# Patient Record
Sex: MALE | Race: WHITE | NOT HISPANIC OR LATINO | Employment: FULL TIME | URBAN - NONMETROPOLITAN AREA
[De-identification: names, ages, dates, MRNs, and addresses within clinical notes are randomized per-mention and may not be internally consistent; named-entity substitution may affect disease eponyms.]

---

## 2024-07-21 ENCOUNTER — HOSPITAL ENCOUNTER (EMERGENCY)
Facility: HOSPITAL | Age: 24
Discharge: HOME | End: 2024-07-21
Attending: STUDENT IN AN ORGANIZED HEALTH CARE EDUCATION/TRAINING PROGRAM
Payer: COMMERCIAL

## 2024-07-21 ENCOUNTER — APPOINTMENT (EMERGENCY)
Dept: CT IMAGING | Facility: HOSPITAL | Age: 24
End: 2024-07-21
Payer: COMMERCIAL

## 2024-07-21 VITALS
WEIGHT: 114 LBS | HEART RATE: 91 BPM | OXYGEN SATURATION: 98 % | SYSTOLIC BLOOD PRESSURE: 133 MMHG | BODY MASS INDEX: 18.99 KG/M2 | TEMPERATURE: 98.5 F | RESPIRATION RATE: 18 BRPM | DIASTOLIC BLOOD PRESSURE: 90 MMHG | HEIGHT: 65 IN

## 2024-07-21 DIAGNOSIS — M54.9 BACK PAIN: Primary | ICD-10-CM

## 2024-07-21 LAB
ALBUMIN SERPL BCG-MCNC: 5 G/DL (ref 3.5–5)
ALP SERPL-CCNC: 60 U/L (ref 34–104)
ALT SERPL W P-5'-P-CCNC: 38 U/L (ref 7–52)
ANION GAP SERPL CALCULATED.3IONS-SCNC: 8 MMOL/L (ref 4–13)
AST SERPL W P-5'-P-CCNC: 29 U/L (ref 13–39)
BASOPHILS # BLD AUTO: 0.04 THOUSANDS/ÂΜL (ref 0–0.1)
BASOPHILS NFR BLD AUTO: 1 % (ref 0–1)
BILIRUB SERPL-MCNC: 0.91 MG/DL (ref 0.2–1)
BILIRUB UR QL STRIP: NEGATIVE
BUN SERPL-MCNC: 14 MG/DL (ref 5–25)
CALCIUM SERPL-MCNC: 9.6 MG/DL (ref 8.4–10.2)
CHLORIDE SERPL-SCNC: 101 MMOL/L (ref 96–108)
CLARITY UR: NORMAL
CO2 SERPL-SCNC: 28 MMOL/L (ref 21–32)
COLOR UR: YELLOW
CREAT SERPL-MCNC: 0.81 MG/DL (ref 0.6–1.3)
EOSINOPHIL # BLD AUTO: 0.07 THOUSAND/ÂΜL (ref 0–0.61)
EOSINOPHIL NFR BLD AUTO: 1 % (ref 0–6)
ERYTHROCYTE [DISTWIDTH] IN BLOOD BY AUTOMATED COUNT: 12.6 % (ref 11.6–15.1)
GFR SERPL CREATININE-BSD FRML MDRD: 126 ML/MIN/1.73SQ M
GLUCOSE SERPL-MCNC: 85 MG/DL (ref 65–140)
GLUCOSE UR STRIP-MCNC: NEGATIVE MG/DL
HCT VFR BLD AUTO: 42.8 % (ref 36.5–49.3)
HGB BLD-MCNC: 15 G/DL (ref 12–17)
HGB UR QL STRIP.AUTO: NEGATIVE
IMM GRANULOCYTES # BLD AUTO: 0.03 THOUSAND/UL (ref 0–0.2)
IMM GRANULOCYTES NFR BLD AUTO: 0 % (ref 0–2)
KETONES UR STRIP-MCNC: NEGATIVE MG/DL
LACTATE SERPL-SCNC: 0.6 MMOL/L (ref 0.5–2)
LEUKOCYTE ESTERASE UR QL STRIP: NEGATIVE
LYMPHOCYTES # BLD AUTO: 2.6 THOUSANDS/ÂΜL (ref 0.6–4.47)
LYMPHOCYTES NFR BLD AUTO: 34 % (ref 14–44)
MCH RBC QN AUTO: 31.6 PG (ref 26.8–34.3)
MCHC RBC AUTO-ENTMCNC: 35 G/DL (ref 31.4–37.4)
MCV RBC AUTO: 90 FL (ref 82–98)
MONOCYTES # BLD AUTO: 0.72 THOUSAND/ÂΜL (ref 0.17–1.22)
MONOCYTES NFR BLD AUTO: 9 % (ref 4–12)
NEUTROPHILS # BLD AUTO: 4.23 THOUSANDS/ÂΜL (ref 1.85–7.62)
NEUTS SEG NFR BLD AUTO: 55 % (ref 43–75)
NITRITE UR QL STRIP: NEGATIVE
NRBC BLD AUTO-RTO: 0 /100 WBCS
PH UR STRIP.AUTO: 7.5 [PH]
PLATELET # BLD AUTO: 316 THOUSANDS/UL (ref 149–390)
PMV BLD AUTO: 9.3 FL (ref 8.9–12.7)
POTASSIUM SERPL-SCNC: 3.6 MMOL/L (ref 3.5–5.3)
PROT SERPL-MCNC: 7.6 G/DL (ref 6.4–8.4)
PROT UR STRIP-MCNC: NEGATIVE MG/DL
RBC # BLD AUTO: 4.75 MILLION/UL (ref 3.88–5.62)
SODIUM SERPL-SCNC: 137 MMOL/L (ref 135–147)
SP GR UR STRIP.AUTO: 1.01 (ref 1–1.03)
UROBILINOGEN UR QL STRIP.AUTO: 0.2 E.U./DL
WBC # BLD AUTO: 7.69 THOUSAND/UL (ref 4.31–10.16)

## 2024-07-21 PROCEDURE — 99284 EMERGENCY DEPT VISIT MOD MDM: CPT | Performed by: PHYSICIAN ASSISTANT

## 2024-07-21 PROCEDURE — 36415 COLL VENOUS BLD VENIPUNCTURE: CPT | Performed by: PHYSICIAN ASSISTANT

## 2024-07-21 PROCEDURE — 74176 CT ABD & PELVIS W/O CONTRAST: CPT

## 2024-07-21 PROCEDURE — 80053 COMPREHEN METABOLIC PANEL: CPT | Performed by: PHYSICIAN ASSISTANT

## 2024-07-21 PROCEDURE — 99284 EMERGENCY DEPT VISIT MOD MDM: CPT

## 2024-07-21 PROCEDURE — 81003 URINALYSIS AUTO W/O SCOPE: CPT | Performed by: PHYSICIAN ASSISTANT

## 2024-07-21 PROCEDURE — 96361 HYDRATE IV INFUSION ADD-ON: CPT

## 2024-07-21 PROCEDURE — 85025 COMPLETE CBC W/AUTO DIFF WBC: CPT | Performed by: PHYSICIAN ASSISTANT

## 2024-07-21 PROCEDURE — 96375 TX/PRO/DX INJ NEW DRUG ADDON: CPT

## 2024-07-21 PROCEDURE — 96374 THER/PROPH/DIAG INJ IV PUSH: CPT

## 2024-07-21 PROCEDURE — 83605 ASSAY OF LACTIC ACID: CPT | Performed by: PHYSICIAN ASSISTANT

## 2024-07-21 RX ORDER — NAPROXEN 500 MG/1
500 TABLET ORAL 2 TIMES DAILY WITH MEALS
Qty: 10 TABLET | Refills: 0 | Status: SHIPPED | OUTPATIENT
Start: 2024-07-21 | End: 2024-07-26

## 2024-07-21 RX ORDER — KETOROLAC TROMETHAMINE 30 MG/ML
15 INJECTION, SOLUTION INTRAMUSCULAR; INTRAVENOUS ONCE
Status: COMPLETED | OUTPATIENT
Start: 2024-07-21 | End: 2024-07-21

## 2024-07-21 RX ORDER — ONDANSETRON 4 MG/1
4 TABLET, FILM COATED ORAL EVERY 6 HOURS
Qty: 12 TABLET | Refills: 0 | Status: SHIPPED | OUTPATIENT
Start: 2024-07-21

## 2024-07-21 RX ORDER — ONDANSETRON 2 MG/ML
4 INJECTION INTRAMUSCULAR; INTRAVENOUS ONCE
Status: COMPLETED | OUTPATIENT
Start: 2024-07-21 | End: 2024-07-21

## 2024-07-21 RX ADMIN — SODIUM CHLORIDE 1000 ML: 0.9 INJECTION, SOLUTION INTRAVENOUS at 15:26

## 2024-07-21 RX ADMIN — ONDANSETRON 4 MG: 2 INJECTION INTRAMUSCULAR; INTRAVENOUS at 15:26

## 2024-07-21 RX ADMIN — KETOROLAC TROMETHAMINE 15 MG: 30 INJECTION, SOLUTION INTRAMUSCULAR; INTRAVENOUS at 15:26

## 2024-07-21 NOTE — DISCHARGE INSTRUCTIONS
Please follow-up with a family doctor, a referral has been placed for you.  Use Tylenol and Motrin as needed for the pain.  Please return to the emergency department with any new or worsening symptoms  CT renal stone study abdomen pelvis wo contrast   Final Result      Bilateral nonobstructing intrarenal calculi measuring up to 2 mm. No hydronephrosis.         Workstation performed: [id number]

## 2024-07-21 NOTE — ED PROVIDER NOTES
"History  Chief Complaint   Patient presents with    Flank Pain     Pt c/o intermittent bilateral flank pain radiating to abdomen w/vomiting over past couple months now more frequent over past week. Denies travel/sob/fevers/cough     23 year old male presents to the ED for evaluation of flank pain. States for the last 6 months intermittently around 8am he has a \"sharp\" back pain on both sides. States he tries to sit down to relieve the pain. States this past time, last Thursday the pain radiated into his abdomen. States sitting did not improve the symptoms and he felt disoriented. [place] like he as going to pass out. States he went to urgent care and had urine checked which was negative was told symptoms are due to back spasm. States last night he got sick after dinner with vomiting went to work this morning and vomited again after eating a banana. Denies dysuria hematuria of frequency. Denies fevers or chills.  Patient denies any testicular pain or swelling.  Patient denies any IV drug use.  He denies any loss of bowel or bladder control.  He denies any saddle paresthesias or rectal numbness.        None       History reviewed. No pertinent past medical history.    History reviewed. No pertinent surgical history.    History reviewed. No pertinent family history.  I have reviewed and agree with the history as documented.    E-Cigarette/Vaping    E-Cigarette Use Current Some Day User      E-Cigarette/Vaping Substances     Social History     Tobacco Use    Smoking status: Some Days    Smokeless tobacco: Never   Vaping Use    Vaping status: Some Days   Substance Use Topics    Alcohol use: Not Currently    Drug use: Never       Review of Systems   Constitutional: Negative.    HENT: Negative.     Respiratory: Negative.     Cardiovascular: Negative.    Gastrointestinal:  Positive for abdominal pain (back pain radiateing to abdomin), nausea and vomiting.   Genitourinary: Negative.    Musculoskeletal:  Positive for back pain. "   Skin: Negative.    Neurological: Negative.    All other systems reviewed and are negative.      Physical Exam  Physical Exam  Vitals and nursing note reviewed.   Constitutional:       General: He is not in acute distress.     Appearance: Normal appearance. He is not ill-appearing, toxic-appearing or diaphoretic.   HENT:      Head: Normocephalic.      Nose: Nose normal.      Mouth/Throat:      Mouth: Mucous membranes are moist.   Eyes:      Conjunctiva/sclera: Conjunctivae normal.      Pupils: Pupils are equal, round, and reactive to light.   Cardiovascular:      Rate and Rhythm: Normal rate and regular rhythm.   Pulmonary:      Effort: Pulmonary effort is normal.      Breath sounds: Normal breath sounds. No stridor. No wheezing, rhonchi or rales.   Chest:      Chest wall: No tenderness.   Abdominal:      General: Abdomen is flat. There is no distension.      Palpations: Abdomen is soft.      Tenderness: There is no abdominal tenderness. There is no right CVA tenderness or left CVA tenderness.   Musculoskeletal:         General: No tenderness. Normal range of motion.      Cervical back: Normal range of motion.   Skin:     General: Skin is warm and dry.      Capillary Refill: Capillary refill takes less than 2 seconds.      Findings: No bruising, erythema or rash.   Neurological:      General: No focal deficit present.      Mental Status: He is alert.   Psychiatric:         Mood and Affect: Mood normal.         Vital Signs  ED Triage Vitals [07/21/24 1433]   Temperature Pulse Respirations Blood Pressure SpO2   98.5 °F (36.9 °C) 91 18 133/90 98 %      Temp src Heart Rate Source Patient Position - Orthostatic VS BP Location FiO2 (%)   -- Monitor Sitting Left arm --      Pain Score       No Pain           Vitals:    07/21/24 1433 07/21/24 1730   BP: 133/90 133/90   Pulse: 91 91   Patient Position - Orthostatic VS: Sitting          Visual Acuity      ED Medications  Medications   sodium chloride 0.9 % bolus 1,000 mL (0  mL Intravenous Stopped 7/21/24 1600)   ondansetron (ZOFRAN) injection 4 mg (4 mg Intravenous Given 7/21/24 1526)   ketorolac (TORADOL) injection 15 mg (15 mg Intravenous Given 7/21/24 1526)       Diagnostic Studies  Results Reviewed       Procedure Component Value Units Date/Time    UA w Reflex to Microscopic w Reflex to Culture [[id number]] Collected: 07/21/24 1634    Lab Status: Final result Specimen: Urine, Clean Catch Updated: 07/21/24 1639     Color, UA Yellow     Clarity, UA Cloudy     Specific Gravity, UA 1.010     pH, UA 7.5     Leukocytes, UA Negative     Nitrite, UA Negative     Protein, UA Negative mg/dl      Glucose, UA Negative mg/dl      Ketones, UA Negative mg/dl      Urobilinogen, UA 0.2 E.U./dl      Bilirubin, UA Negative     Occult Blood, UA Negative    Comprehensive metabolic panel [[id number]] Collected: 07/21/24 1523    Lab Status: Final result Specimen: Blood from Arm, Left Updated: 07/21/24 1548     Sodium 137 mmol/L      Potassium 3.6 mmol/L      Chloride 101 mmol/L      CO2 28 mmol/L      ANION GAP 8 mmol/L      BUN 14 mg/dL      Creatinine 0.81 mg/dL      Glucose 85 mg/dL      Calcium 9.6 mg/dL      AST 29 U/L      ALT 38 U/L      Alkaline Phosphatase 60 U/L      Total Protein 7.6 g/dL      Albumin 5.0 g/dL      Total Bilirubin 0.91 mg/dL      eGFR 126 ml/min/1.73sq m     Narrative:      National Kidney Disease Foundation guidelines for Chronic Kidney Disease (CKD):     Stage 1 with normal or high GFR (GFR > 90 mL/min/1.73 square meters)    Stage 2 Mild CKD (GFR = 60-89 mL/min/1.73 square meters)    Stage 3A Moderate CKD (GFR = 45-59 mL/min/1.73 square meters)    Stage 3B Moderate CKD (GFR = 30-44 mL/min/1.73 square meters)    Stage 4 Severe CKD (GFR = 15-29 mL/min/1.73 square meters)    Stage 5 End Stage CKD (GFR <15 mL/min/1.73 square meters)  Note: GFR calculation is accurate only with a steady state creatinine    Lactic acid, plasma (w/reflex if result > 2.0) [[id number]]  (Normal)  Collected: 07/21/24 1523    Lab Status: Final result Specimen: Blood from Arm, Left Updated: 07/21/24 1548     LACTIC ACID 0.6 mmol/L     Narrative:      Result may be elevated if tourniquet was used during collection.    CBC and differential [[id number]] Collected: 07/21/24 1523    Lab Status: Final result Specimen: Blood from Arm, Left Updated: 07/21/24 1534     WBC 7.69 Thousand/uL      RBC 4.75 Million/uL      Hemoglobin 15.0 g/dL      Hematocrit 42.8 %      MCV 90 fL      MCH 31.6 pg      MCHC 35.0 g/dL      RDW 12.6 %      MPV 9.3 fL      Platelets 316 Thousands/uL      nRBC 0 /100 WBCs      Segmented % 55 %      Immature Grans % 0 %      Lymphocytes % 34 %      Monocytes % 9 %      Eosinophils Relative 1 %      Basophils Relative 1 %      Absolute Neutrophils 4.23 Thousands/µL      Absolute Immature Grans 0.03 Thousand/uL      Absolute Lymphocytes 2.60 Thousands/µL      Absolute Monocytes 0.72 Thousand/µL      Eosinophils Absolute 0.07 Thousand/µL      Basophils Absolute 0.04 Thousands/µL                    CT renal stone study abdomen pelvis wo contrast   Final Result by Charles Lopez MD (07/21 1721)      Bilateral nonobstructing intrarenal calculi measuring up to 2 mm. No hydronephrosis.         Workstation performed: [id number]                    Procedures  Procedures         ED Course  ED Course as of 07/21/24 1826   Sun Jul 21, 2024   1537 WBC: 7.69   1552 LACTIC ACID: 0.6   1552 Creatinine: 0.81   1552 Comprehensive metabolic panel  Unremarkable    1649 UA w Reflex to Microscopic w Reflex to Culture  Negative for UTI   1657 Patient updated on results and findings.  Resting comfortably.  CT pending   1728 CT renal stone: Bilateral nonobstructing intrarenal calculi measuring up to 2 mm. No hydronephrosis.   1728 Discussed results and findings with the patient we discussed appropriate follow-up and patient verbalized understanding.  He is clinically and hemodynamically stable for discharge                                                Medical Decision Making  23-year-old male presents the emergency department for evaluation of intermittent episodes of back pain had 1 episode of pain rating to the stomach.  Recent nausea and vomiting.  Vitals and medical record reviewed. Abdominal exam without peritoneal signs.  No evidence of acute abdomen at this time.  Well-appearing.  Given work-up, low suspicion for acute hepatobiliary disease (including acute cholecystitis or cholangitis), acute pancreatitis, PUD (including gastric perforation), acute infectious processes (pneumonia, hepatitis, pyelonephritis), acute appendicitis, vascular catastrophe, bowel obstruction, viscus perforation, testicular torsion, or diverticulitis.  Lower concern for any back fracture no step-off deformities, no direct trauma or injury.  Lower concern for cauda equina syndrome patient denied any red flag symptoms.  Presentation not consistent with other acute emergent causes of abdominal pain at this time.  Discussed return precautions and follow-up and both patient and mother verbalized understanding.  Patient was clinically and hemodynamically stable for discharge      Problems Addressed:  Back pain: acute illness or injury    Amount and/or Complexity of Data Reviewed  Labs: ordered. Decision-making details documented in ED Course.  Radiology: ordered.    Risk  Prescription drug management.                 Disposition  Final diagnoses:   Back pain     Time reflects when diagnosis was documented in both MDM as applicable and the Disposition within this note       Time User Action Codes Description Comment    7/21/2024  5:28 PM Erin Shirley Add [M54.9] Back pain           ED Disposition       ED Disposition   Discharge    Condition   Stable    Date/Time   Sun Jul 21, 2024  5:28 PM    Comment   Martin Luo discharge to home/self care.                   Follow-up Information       Follow up With Specialties Details Why Contact Info     [facility]   [address]  [address]  [place] [address]  [phone]              Discharge Medication List as of 7/21/2024  5:30 PM        START taking these medications    Details   naproxen (Naprosyn) 500 mg tablet Take 1 tablet (500 mg total) by mouth 2 (two) times a day with meals for 5 days, Starting Sun 7/21/2024, Until Fri 7/26/2024, Normal      ondansetron (ZOFRAN) 4 mg tablet Take 1 tablet (4 mg total) by mouth every 6 (six) hours, Starting Sun 7/21/2024, Normal             No discharge procedures on file.    PDMP Review       None            ED Provider  Electronically Signed by             Erin Shirley PA-C  07/21/24 1563

## 2024-08-08 ENCOUNTER — OFFICE VISIT (OUTPATIENT)
Dept: URGENT CARE | Facility: CLINIC | Age: 24
End: 2024-08-08
Payer: COMMERCIAL

## 2024-08-08 ENCOUNTER — APPOINTMENT (EMERGENCY)
Dept: CT IMAGING | Facility: HOSPITAL | Age: 24
End: 2024-08-08
Payer: COMMERCIAL

## 2024-08-08 ENCOUNTER — HOSPITAL ENCOUNTER (EMERGENCY)
Facility: HOSPITAL | Age: 24
Discharge: HOME | End: 2024-08-08
Attending: EMERGENCY MEDICINE
Payer: COMMERCIAL

## 2024-08-08 VITALS
BODY MASS INDEX: 20.13 KG/M2 | SYSTOLIC BLOOD PRESSURE: 132 MMHG | TEMPERATURE: 97.6 F | WEIGHT: 120.81 LBS | HEART RATE: 72 BPM | HEIGHT: 65 IN | OXYGEN SATURATION: 99 % | DIASTOLIC BLOOD PRESSURE: 73 MMHG | RESPIRATION RATE: 16 BRPM

## 2024-08-08 VITALS
RESPIRATION RATE: 16 BRPM | HEART RATE: 77 BPM | OXYGEN SATURATION: 98 % | SYSTOLIC BLOOD PRESSURE: 123 MMHG | WEIGHT: 119.4 LBS | DIASTOLIC BLOOD PRESSURE: 75 MMHG | BODY MASS INDEX: 19.89 KG/M2 | HEIGHT: 65 IN | TEMPERATURE: 98.3 F

## 2024-08-08 DIAGNOSIS — N20.0 KIDNEY STONE: Primary | ICD-10-CM

## 2024-08-08 DIAGNOSIS — N39.0 COMPLICATED UTI (URINARY TRACT INFECTION): Primary | ICD-10-CM

## 2024-08-08 LAB
ALBUMIN SERPL BCG-MCNC: 4.9 G/DL (ref 3.5–5)
ALP SERPL-CCNC: 58 U/L (ref 34–104)
ALT SERPL W P-5'-P-CCNC: 29 U/L (ref 7–52)
ANION GAP SERPL CALCULATED.3IONS-SCNC: 10 MMOL/L (ref 4–13)
AST SERPL W P-5'-P-CCNC: 30 U/L (ref 13–39)
BACTERIA UR QL AUTO: NORMAL /HPF
BASOPHILS # BLD AUTO: 0.04 THOUSANDS/ÂΜL (ref 0–0.1)
BASOPHILS NFR BLD AUTO: 1 % (ref 0–1)
BILIRUB SERPL-MCNC: 1.25 MG/DL (ref 0.2–1)
BILIRUB UR QL STRIP: NEGATIVE
BUN SERPL-MCNC: 12 MG/DL (ref 5–25)
CALCIUM SERPL-MCNC: 9.8 MG/DL (ref 8.4–10.2)
CHLORIDE SERPL-SCNC: 103 MMOL/L (ref 96–108)
CLARITY UR: CLEAR
CO2 SERPL-SCNC: 25 MMOL/L (ref 21–32)
COLOR UR: YELLOW
CREAT SERPL-MCNC: 0.84 MG/DL (ref 0.6–1.3)
EOSINOPHIL # BLD AUTO: 0.02 THOUSAND/ÂΜL (ref 0–0.61)
EOSINOPHIL NFR BLD AUTO: 0 % (ref 0–6)
ERYTHROCYTE [DISTWIDTH] IN BLOOD BY AUTOMATED COUNT: 12.3 % (ref 11.6–15.1)
GFR SERPL CREATININE-BSD FRML MDRD: 124 ML/MIN/1.73SQ M
GLUCOSE SERPL-MCNC: 81 MG/DL (ref 65–140)
GLUCOSE UR STRIP-MCNC: NEGATIVE MG/DL
HCT VFR BLD AUTO: 42.7 % (ref 36.5–49.3)
HGB BLD-MCNC: 14.8 G/DL (ref 12–17)
HGB UR QL STRIP.AUTO: ABNORMAL
IMM GRANULOCYTES # BLD AUTO: 0.02 THOUSAND/UL (ref 0–0.2)
IMM GRANULOCYTES NFR BLD AUTO: 0 % (ref 0–2)
KETONES UR STRIP-MCNC: ABNORMAL MG/DL
LEUKOCYTE ESTERASE UR QL STRIP: NEGATIVE
LIPASE SERPL-CCNC: 13 U/L (ref 11–82)
LYMPHOCYTES # BLD AUTO: 2.01 THOUSANDS/ÂΜL (ref 0.6–4.47)
LYMPHOCYTES NFR BLD AUTO: 24 % (ref 14–44)
MCH RBC QN AUTO: 31.1 PG (ref 26.8–34.3)
MCHC RBC AUTO-ENTMCNC: 34.7 G/DL (ref 31.4–37.4)
MCV RBC AUTO: 90 FL (ref 82–98)
MONOCYTES # BLD AUTO: 0.69 THOUSAND/ÂΜL (ref 0.17–1.22)
MONOCYTES NFR BLD AUTO: 8 % (ref 4–12)
NEUTROPHILS # BLD AUTO: 5.52 THOUSANDS/ÂΜL (ref 1.85–7.62)
NEUTS SEG NFR BLD AUTO: 67 % (ref 43–75)
NITRITE UR QL STRIP: NEGATIVE
NON-SQ EPI CELLS URNS QL MICRO: NORMAL /HPF
NRBC BLD AUTO-RTO: 0 /100 WBCS
PH UR STRIP.AUTO: 6 [PH]
PLATELET # BLD AUTO: 254 THOUSANDS/UL (ref 149–390)
PMV BLD AUTO: 9.3 FL (ref 8.9–12.7)
POTASSIUM SERPL-SCNC: 3.8 MMOL/L (ref 3.5–5.3)
PROT SERPL-MCNC: 7.2 G/DL (ref 6.4–8.4)
PROT UR STRIP-MCNC: NEGATIVE MG/DL
RBC # BLD AUTO: 4.76 MILLION/UL (ref 3.88–5.62)
RBC #/AREA URNS AUTO: NORMAL /HPF
SL AMB  POCT GLUCOSE, UA: NEGATIVE
SL AMB LEUKOCYTE ESTERASE,UA: ABNORMAL
SL AMB POCT BILIRUBIN,UA: NEGATIVE
SL AMB POCT BLOOD,UA: ABNORMAL
SL AMB POCT CLARITY,UA: CLEAR
SL AMB POCT COLOR,UA: YELLOW
SL AMB POCT KETONES,UA: ABNORMAL
SL AMB POCT NITRITE,UA: NEGATIVE
SL AMB POCT PH,UA: 6
SL AMB POCT SPECIFIC GRAVITY,UA: 1.01
SL AMB POCT URINE PROTEIN: NEGATIVE
SL AMB POCT UROBILINOGEN: ABNORMAL
SODIUM SERPL-SCNC: 138 MMOL/L (ref 135–147)
SP GR UR STRIP.AUTO: 1.01 (ref 1–1.03)
UROBILINOGEN UR QL STRIP.AUTO: 0.2 E.U./DL
WBC # BLD AUTO: 8.3 THOUSAND/UL (ref 4.31–10.16)
WBC #/AREA URNS AUTO: NORMAL /HPF

## 2024-08-08 PROCEDURE — S9083 URGENT CARE CENTER GLOBAL: HCPCS

## 2024-08-08 PROCEDURE — 99284 EMERGENCY DEPT VISIT MOD MDM: CPT | Performed by: EMERGENCY MEDICINE

## 2024-08-08 PROCEDURE — 74176 CT ABD & PELVIS W/O CONTRAST: CPT

## 2024-08-08 PROCEDURE — 80053 COMPREHEN METABOLIC PANEL: CPT | Performed by: EMERGENCY MEDICINE

## 2024-08-08 PROCEDURE — 85025 COMPLETE CBC W/AUTO DIFF WBC: CPT | Performed by: EMERGENCY MEDICINE

## 2024-08-08 PROCEDURE — 83690 ASSAY OF LIPASE: CPT | Performed by: EMERGENCY MEDICINE

## 2024-08-08 PROCEDURE — 36415 COLL VENOUS BLD VENIPUNCTURE: CPT | Performed by: EMERGENCY MEDICINE

## 2024-08-08 PROCEDURE — 96374 THER/PROPH/DIAG INJ IV PUSH: CPT

## 2024-08-08 PROCEDURE — 81001 URINALYSIS AUTO W/SCOPE: CPT | Performed by: EMERGENCY MEDICINE

## 2024-08-08 PROCEDURE — 99283 EMERGENCY DEPT VISIT LOW MDM: CPT

## 2024-08-08 PROCEDURE — 81002 URINALYSIS NONAUTO W/O SCOPE: CPT

## 2024-08-08 PROCEDURE — G0382 LEV 3 HOSP TYPE B ED VISIT: HCPCS

## 2024-08-08 PROCEDURE — 96375 TX/PRO/DX INJ NEW DRUG ADDON: CPT

## 2024-08-08 PROCEDURE — 96361 HYDRATE IV INFUSION ADD-ON: CPT

## 2024-08-08 RX ORDER — ONDANSETRON 2 MG/ML
4 INJECTION INTRAMUSCULAR; INTRAVENOUS ONCE
Status: COMPLETED | OUTPATIENT
Start: 2024-08-08 | End: 2024-08-08

## 2024-08-08 RX ORDER — KETOROLAC TROMETHAMINE 30 MG/ML
30 INJECTION, SOLUTION INTRAMUSCULAR; INTRAVENOUS ONCE
Status: COMPLETED | OUTPATIENT
Start: 2024-08-08 | End: 2024-08-08

## 2024-08-08 RX ORDER — CEPHALEXIN 500 MG/1
500 CAPSULE ORAL EVERY 6 HOURS SCHEDULED
Qty: 40 CAPSULE | Refills: 0 | Status: SHIPPED | OUTPATIENT
Start: 2024-08-08 | End: 2024-08-18

## 2024-08-08 RX ORDER — ONDANSETRON 4 MG/1
4 TABLET, FILM COATED ORAL EVERY 6 HOURS
Qty: 20 TABLET | Refills: 0 | Status: SHIPPED | OUTPATIENT
Start: 2024-08-08

## 2024-08-08 RX ORDER — TAMSULOSIN HYDROCHLORIDE 0.4 MG/1
0.4 CAPSULE ORAL
Qty: 10 CAPSULE | Refills: 0 | Status: SHIPPED | OUTPATIENT
Start: 2024-08-08 | End: 2024-08-18

## 2024-08-08 RX ORDER — TAMSULOSIN HYDROCHLORIDE 0.4 MG/1
0.4 CAPSULE ORAL
Qty: 7 CAPSULE | Refills: 0 | Status: SHIPPED | OUTPATIENT
Start: 2024-08-08 | End: 2024-08-15

## 2024-08-08 RX ORDER — TAMSULOSIN HYDROCHLORIDE 0.4 MG/1
0.4 CAPSULE ORAL ONCE
Status: COMPLETED | OUTPATIENT
Start: 2024-08-08 | End: 2024-08-08

## 2024-08-08 RX ORDER — IBUPROFEN 800 MG/1
800 TABLET ORAL 3 TIMES DAILY
Qty: 21 TABLET | Refills: 0 | Status: SHIPPED | OUTPATIENT
Start: 2024-08-08

## 2024-08-08 RX ADMIN — ONDANSETRON 4 MG: 2 INJECTION INTRAMUSCULAR; INTRAVENOUS at 15:26

## 2024-08-08 RX ADMIN — TAMSULOSIN HYDROCHLORIDE 0.4 MG: 0.4 CAPSULE ORAL at 16:37

## 2024-08-08 RX ADMIN — KETOROLAC TROMETHAMINE 30 MG: 30 INJECTION, SOLUTION INTRAMUSCULAR; INTRAVENOUS at 15:26

## 2024-08-08 RX ADMIN — SODIUM CHLORIDE 1000 ML: 0.9 INJECTION, SOLUTION INTRAVENOUS at 15:27

## 2024-08-08 NOTE — DISCHARGE INSTRUCTIONS
Return to the ER immediately for any worsening symptoms. Follow up with your doctor for further evaluation and management.

## 2024-08-08 NOTE — PATIENT INSTRUCTIONS
Recommended patient proceed to ED for further evaluation. Discussed risks of delayed evaluation and intervention with serious etiology. Patient verbalized understanding.     Take cephalexin as prescribed  Take flomax as prescribed    Drink plenty of water   Cranberry supplements  Urinate within 5 minutes following intercourse  Avoid holding in urine    Follow up with PCP in 3-5 days.  Proceed to  ER if symptoms worsen.    If tests are performed, our office will contact you with results only if changes need to made to the care plan discussed with you at the visit. You can review your full results on [facility] Mychart.

## 2024-08-08 NOTE — ED PROVIDER NOTES
History  Chief Complaint   Patient presents with    Difficulty Urinating     Patient reports being sent from  for blood in his urine. Recently diagnosed with kidney stones two weeks ago and now has difficulty urinating since last evening  .      23-year-old male presents to the ED for evaluation of hematuria today.  Patient states that he was diagnosed with a kidney stone 2 weeks ago.  He denies any fever and chills states that he has been having left-sided abdominal pain radiating to his flank.  He states his pain is intermittent.        Prior to Admission Medications   Prescriptions Last Dose Informant Patient Reported? Taking?   cephalexin (KEFLEX) 500 mg capsule   No No   Sig: Take 1 capsule (500 mg total) by mouth every 6 (six) hours for 10 days   naproxen (Naprosyn) 500 mg tablet   No No   Sig: Take 1 tablet (500 mg total) by mouth 2 (two) times a day with meals for 5 days   ondansetron (ZOFRAN) 4 mg tablet   No No   Sig: Take 1 tablet (4 mg total) by mouth every 6 (six) hours   tamsulosin (FLOMAX) 0.4 mg   No No   Sig: Take 1 capsule (0.4 mg total) by mouth daily with dinner for 7 days      Facility-Administered Medications: None       Past Medical History:   Diagnosis Date    Essential tremor     Kidney stone     Migraines        Past Surgical History:   Procedure Laterality Date    NO PAST SURGERIES         Family History   Problem Relation Age of Onset    No Known Problems Mother     No Known Problems Father      I have reviewed and agree with the history as documented.    E-Cigarette/Vaping    E-Cigarette Use Current Some Day User      E-Cigarette/Vaping Substances    Nicotine Yes     THC No     CBD No     Flavoring Yes      Social History     Tobacco Use    Smoking status: Former     Types: Cigarettes    Smokeless tobacco: Never   Vaping Use    Vaping status: Some Days    Substances: Nicotine, Flavoring   Substance Use Topics    Alcohol use: Not Currently    Drug use: Never       Review of Systems    Constitutional:  Negative for chills and fever.   HENT:  Negative for ear pain and sore throat.    Eyes:  Negative for pain and visual disturbance.   Respiratory:  Negative for cough and shortness of breath.    Cardiovascular:  Negative for chest pain and palpitations.   Gastrointestinal:  Negative for abdominal pain and vomiting.   Genitourinary:  Positive for difficulty urinating, flank pain and hematuria. Negative for dysuria.   Musculoskeletal:  Negative for arthralgias and back pain.   Skin:  Negative for color change and rash.   Neurological:  Negative for seizures and syncope.   All other systems reviewed and are negative.      Physical Exam  Physical Exam  Vitals and nursing note reviewed.   Constitutional:       General: He is not in acute distress.     Appearance: Normal appearance. He is well-developed and normal weight. He is not ill-appearing.   HENT:      Head: Normocephalic and atraumatic.      Right Ear: External ear normal.      Left Ear: External ear normal.      Nose: Nose normal.   Eyes:      Extraocular Movements: Extraocular movements intact.      Conjunctiva/sclera: Conjunctivae normal.   Cardiovascular:      Rate and Rhythm: Normal rate and regular rhythm.      Heart sounds: No murmur heard.  Pulmonary:      Effort: Pulmonary effort is normal. No respiratory distress.      Breath sounds: Normal breath sounds.   Abdominal:      General: Abdomen is flat.      Palpations: Abdomen is soft.      Tenderness: There is no abdominal tenderness.   Musculoskeletal:         General: No swelling, tenderness, deformity or signs of injury. Normal range of motion.      Cervical back: Normal range of motion and neck supple.      Right lower leg: No edema.      Left lower leg: No edema.   Skin:     General: Skin is warm and dry.      Capillary Refill: Capillary refill takes less than 2 seconds.   Neurological:      General: No focal deficit present.      Mental Status: He is alert and oriented to person,  place, and time. Mental status is at baseline.   Psychiatric:         Mood and Affect: Mood normal.         Vital Signs  ED Triage Vitals   Temperature Pulse Respirations Blood Pressure SpO2   08/08/24 1512 08/08/24 1512 08/08/24 1512 08/08/24 1512 08/08/24 1512   97.6 °F (36.4 °C) 84 16 144/81 100 %      Temp Source Heart Rate Source Patient Position - Orthostatic VS BP Location FiO2 (%)   08/08/24 1512 08/08/24 1512 08/08/24 1512 08/08/24 1512 --   Temporal Monitor Lying Right arm       Pain Score       08/08/24 1526       3           Vitals:    08/08/24 1512 08/08/24 1545 08/08/24 1615 08/08/24 1645   BP: 144/81 135/75 133/72 132/73   Pulse: 84 73 72 72   Patient Position - Orthostatic VS: Lying Lying Lying Lying         Visual Acuity      ED Medications  Medications   sodium chloride 0.9 % bolus 1,000 mL (0 mL Intravenous Stopped 8/8/24 1627)   ketorolac (TORADOL) injection 30 mg (30 mg Intravenous Given 8/8/24 1526)   ondansetron (ZOFRAN) injection 4 mg (4 mg Intravenous Given 8/8/24 1526)   tamsulosin (FLOMAX) capsule 0.4 mg (0.4 mg Oral Given 8/8/24 1637)       Diagnostic Studies  Results Reviewed       Procedure Component Value Units Date/Time    Urine Microscopic [[id number]]  (Normal) Collected: 08/08/24 1601    Lab Status: Final result Specimen: Urine, Clean Catch Updated: 08/08/24 1618     RBC, UA 0-1 /hpf      WBC, UA None Seen /hpf      Epithelial Cells Occasional /hpf      Bacteria, UA None Seen /hpf     UA w Reflex to Microscopic w Reflex to Culture [[id number]]  (Abnormal) Collected: 08/08/24 1601    Lab Status: Final result Specimen: Urine, Clean Catch Updated: 08/08/24 1613     Color, UA Yellow     Clarity, UA Clear     Specific Gravity, UA 1.010     pH, UA 6.0     Leukocytes, UA Negative     Nitrite, UA Negative     Protein, UA Negative mg/dl      Glucose, UA Negative mg/dl      Ketones, UA Trace mg/dl      Urobilinogen, UA 0.2 E.U./dl      Bilirubin, UA Negative     Occult Blood, UA Trace-Intact     CMP [[id number]]  (Abnormal) Collected: 08/08/24 1526    Lab Status: Final result Specimen: Blood from Arm, Left Updated: 08/08/24 1552     Sodium 138 mmol/L      Potassium 3.8 mmol/L      Chloride 103 mmol/L      CO2 25 mmol/L      ANION GAP 10 mmol/L      BUN 12 mg/dL      Creatinine 0.84 mg/dL      Glucose 81 mg/dL      Calcium 9.8 mg/dL      AST 30 U/L      ALT 29 U/L      Alkaline Phosphatase 58 U/L      Total Protein 7.2 g/dL      Albumin 4.9 g/dL      Total Bilirubin 1.25 mg/dL      eGFR 124 ml/min/1.73sq m     Narrative:      National Kidney Disease Foundation guidelines for Chronic Kidney Disease (CKD):     Stage 1 with normal or high GFR (GFR > 90 mL/min/1.73 square meters)    Stage 2 Mild CKD (GFR = 60-89 mL/min/1.73 square meters)    Stage 3A Moderate CKD (GFR = 45-59 mL/min/1.73 square meters)    Stage 3B Moderate CKD (GFR = 30-44 mL/min/1.73 square meters)    Stage 4 Severe CKD (GFR = 15-29 mL/min/1.73 square meters)    Stage 5 End Stage CKD (GFR <15 mL/min/1.73 square meters)  Note: GFR calculation is accurate only with a steady state creatinine    Lipase [[id number]]  (Normal) Collected: 08/08/24 1526    Lab Status: Final result Specimen: Blood from Arm, Left Updated: 08/08/24 1552     Lipase 13 u/L     CBC and differential [[id number]] Collected: 08/08/24 1526    Lab Status: Final result Specimen: Blood from Arm, Left Updated: 08/08/24 1535     WBC 8.30 Thousand/uL      RBC 4.76 Million/uL      Hemoglobin 14.8 g/dL      Hematocrit 42.7 %      MCV 90 fL      MCH 31.1 pg      MCHC 34.7 g/dL      RDW 12.3 %      MPV 9.3 fL      Platelets 254 Thousands/uL      nRBC 0 /100 WBCs      Segmented % 67 %      Immature Grans % 0 %      Lymphocytes % 24 %      Monocytes % 8 %      Eosinophils Relative 0 %      Basophils Relative 1 %      Absolute Neutrophils 5.52 Thousands/µL      Absolute Immature Grans 0.02 Thousand/uL      Absolute Lymphocytes 2.01 Thousands/µL      Absolute Monocytes 0.69 Thousand/µL       Eosinophils Absolute 0.02 Thousand/µL      Basophils Absolute 0.04 Thousands/µL                    CT renal stone study abdomen pelvis wo contrast   Final Result by Charles Lopez MD (08/08 1615)      Mild left hydroureteronephrosis due to an obstructing 2 mm calculus at the left UV junction image 2/138.         Workstation performed: [id number]                    Procedures  Procedures         ED Course       Patient stable during the ED course.  Will be discharged for outpatient follow-up with urology.                          SBIRT 22yo+      Flowsheet Row Most Recent Value   Initial Alcohol Screen: US AUDIT-C     1. How often do you have a drink containing alcohol? 0 Filed at: 08/08/2024 1511   2. How many drinks containing alcohol do you have on a typical day you are drinking?  0 Filed at: 08/08/2024 1511   3a. Male UNDER 65: How often do you have five or more drinks on one occasion? 0 Filed at: 08/08/2024 1511   Audit-C Score 0 Filed at: 08/08/2024 1511   THANG: How many times in the past year have you...    Used an illegal drug or used a prescription medication for non-medical reasons? Never Filed at: 08/08/2024 1511                      Medical Decision Making  Amount and/or Complexity of Data Reviewed  Labs: ordered.  Radiology: ordered.    Risk  Prescription drug management.                 Disposition  Final diagnoses:   Kidney stone     Time reflects when diagnosis was documented in both MDM as applicable and the Disposition within this note       Time User Action Codes Description Comment    8/8/2024  4:43 PM Belen Morley Add [N20.0] Kidney stone           ED Disposition       ED Disposition   Discharge    Condition   Stable    Date/Time   Thu Aug 8, 2024 1643    Comment   Martin Luo discharge to home/self care.                   Follow-up Information       Follow up With Specialties Details Why Contact Info    Frank D'Amico, MD Urology Schedule an appointment as soon as possible for a visit   [id number] [address]  [phone]              Discharge Medication List as of 8/8/2024  4:46 PM        START taking these medications    Details   ibuprofen (MOTRIN) 800 mg tablet Take 1 tablet (800 mg total) by mouth 3 (three) times a day, Starting u 8/8/2024, Normal      !! ondansetron (ZOFRAN) 4 mg tablet Take 1 tablet (4 mg total) by mouth every 6 (six) hours, Starting u 8/8/2024, Normal      !! tamsulosin (FLOMAX) 0.4 mg Take 1 capsule (0.4 mg total) by mouth daily with dinner for 10 days, Starting Thu 8/8/2024, Until Sun 8/18/2024, Normal       !! - Potential duplicate medications found. Please discuss with provider.        CONTINUE these medications which have NOT CHANGED    Details   cephalexin (KEFLEX) 500 mg capsule Take 1 capsule (500 mg total) by mouth every 6 (six) hours for 10 days, Starting Thu 8/8/2024, Until Sun 8/18/2024, Normal      naproxen (Naprosyn) 500 mg tablet Take 1 tablet (500 mg total) by mouth 2 (two) times a day with meals for 5 days, Starting Sun 7/21/2024, Until u 8/8/2024, Normal      !! ondansetron (ZOFRAN) 4 mg tablet Take 1 tablet (4 mg total) by mouth every 6 (six) hours, Starting Sun 7/21/2024, Normal      !! tamsulosin (FLOMAX) 0.4 mg Take 1 capsule (0.4 mg total) by mouth daily with dinner for 7 days, Starting Thu 8/8/2024, Until Thu 8/15/2024, Normal       !! - Potential duplicate medications found. Please discuss with provider.              PDMP Review       None            ED Provider  Electronically Signed by             Belen Morley DO  08/08/24 7666

## 2024-08-08 NOTE — PROGRESS NOTES
[facility]        NAME: Martin Luo is a 23 y.o. male  : 2000    MRN: [id number]  DATE: 2024  TIME: 2:44 PM    Assessment and Plan   Complicated UTI (urinary tract infection) [N39.0]  1. Complicated UTI (urinary tract infection)  POCT urine dip    cephalexin (KEFLEX) 500 mg capsule    tamsulosin (FLOMAX) 0.4 mg    Transfer to other facility        POCT urine dip: positive for blood, small leukocytes, and moderate ketones.   Patient only able to void 10ml of urine and reports he has been drinking lots of water today and has not urinated at home only very small dribbles.   Unable to check PVR or bladder scan at this location - concerned for obstructing stone vs FRANCO  Recommend ED eval    Patient Instructions     Recommended patient proceed to ED for further evaluation. Discussed risks of delayed evaluation and intervention with serious etiology. Patient verbalized understanding.     Take cephalexin as prescribed  Take flomax as prescribed    Drink plenty of water   Cranberry supplements  Urinate within 5 minutes following intercourse  Avoid holding in urine    Follow up with PCP in 3-5 days.  Proceed to  ER if symptoms worsen.    If tests are performed, our office will contact you with results only if changes need to made to the care plan discussed with you at the visit. [facility]    Chief Complaint     Chief Complaint   Patient presents with    Back Pain     In ER 2-3 weeks ago for back pain that would lead to syncopal episodes, dx with kidney stones. Back pain worsened yesterday and started with abdominal pain. Pt states he is having the urge to pee but only able to pass small amounts of urine starting yesterday.     Taking naproxen for pain.          History of Present Illness       23-year-old male arrives reporting that he was seen in the ER approximately 2 to 3 weeks ago and was diagnosed with kidney stones.  Patient reports that today  while he was at work his back pain worsened and patient was concerned he may be of actually passing a kidney stone at this time.  Patient reports pain was sharp and generalized in abdomen.  Patient reports that he has been drinking a lot of water but feels that he is not able to urinate and is only going very small amounts at a time.  Patient reports he is sexually active, has no concerns for STDs at this time, denies any penile discharge or pain.  Patient does report pain in testicles that started yesterday.  Patient reports pain in the testicles is generalized and will come when he feels like he has to urinate but is unable to go.    Back Pain  Associated symptoms include abdominal pain and dysuria. Pertinent negatives include no fever.       Review of Systems   Review of Systems   Constitutional:  Negative for chills, diaphoresis, fatigue and fever.   Gastrointestinal:  Positive for abdominal pain. Negative for abdominal distention, constipation, diarrhea, nausea and vomiting.   Genitourinary:  Positive for decreased urine volume, difficulty urinating (pt reports only going small amounts), dysuria and testicular pain. Negative for flank pain, penile discharge, penile pain, penile swelling and scrotal swelling.   Musculoskeletal:  Positive for back pain.         Current Medications       Current Outpatient Medications:     cephalexin (KEFLEX) 500 mg capsule, Take 1 capsule (500 mg total) by mouth every 6 (six) hours for 10 days, Disp: 40 capsule, Rfl: 0    naproxen (Naprosyn) 500 mg tablet, Take 1 tablet (500 mg total) by mouth 2 (two) times a day with meals for 5 days, Disp: 10 tablet, Rfl: 0    ondansetron (ZOFRAN) 4 mg tablet, Take 1 tablet (4 mg total) by mouth every 6 (six) hours, Disp: 12 tablet, Rfl: 0    tamsulosin (FLOMAX) 0.4 mg, Take 1 capsule (0.4 mg total) by mouth daily with dinner for 7 days, Disp: 7 capsule, Rfl: 0    Current Allergies     Allergies as of 08/08/2024 - Reviewed 08/08/2024   Allergen  "Reaction Noted    Dust mite extract Other (See Comments) 12/27/2016    Other Other (See Comments) 12/27/2016            The following portions of the patient's history were reviewed and updated as appropriate: allergies, current medications, past family history, past medical history, past social history, past surgical history and problem list.     Past Medical History:   Diagnosis Date    Essential tremor     Kidney stone     Migraines        Past Surgical History:   Procedure Laterality Date    NO PAST SURGERIES         Family History   Problem Relation Age of Onset    No Known Problems Mother     No Known Problems Father          Medications have been verified.        Objective   /75   Pulse 77   Temp 98.3 °F (36.8 °C)   Resp 16   Ht 5' 5\" (1.651 m)   Wt 54.2 kg (119 lb 6.4 oz)   SpO2 98%   BMI 19.87 kg/m²        Physical Exam     Physical Exam  Vitals and nursing note reviewed.   Constitutional:       General: He is not in acute distress.     Appearance: Normal appearance. He is not ill-appearing.   HENT:      Head: Normocephalic.      Right Ear: Tympanic membrane, ear canal and external ear normal.      Left Ear: Tympanic membrane, ear canal and external ear normal.      Nose: Nose normal. No congestion.      Mouth/Throat:      Mouth: Mucous membranes are moist.      Pharynx: No oropharyngeal exudate or posterior oropharyngeal erythema.   Eyes:      Extraocular Movements: Extraocular movements intact.      Conjunctiva/sclera: Conjunctivae normal.      Pupils: Pupils are equal, round, and reactive to light.   Cardiovascular:      Rate and Rhythm: Normal rate and regular rhythm.      Pulses: Normal pulses.      Heart sounds: Normal heart sounds.   Pulmonary:      Effort: Pulmonary effort is normal. No respiratory distress.      Breath sounds: Normal breath sounds. No stridor. No wheezing, rhonchi or rales.   Chest:      Chest wall: No tenderness.   Abdominal:      General: Abdomen is flat. Bowel " sounds are normal. There is no distension.      Palpations: Abdomen is soft.      Tenderness: There is generalized abdominal tenderness (generalized). There is no right CVA tenderness, left CVA tenderness or guarding.   Musculoskeletal:         General: Normal range of motion.      Cervical back: Normal range of motion and neck supple.   Lymphadenopathy:      Cervical: No cervical adenopathy.   Skin:     General: Skin is warm and dry.      Capillary Refill: Capillary refill takes less than 2 seconds.   Neurological:      General: No focal deficit present.      Mental Status: He is alert and oriented to person, place, and time.   Psychiatric:         Mood and Affect: Mood normal.         Behavior: Behavior normal.

## 2024-09-07 PROBLEM — Z87.898 HISTORY OF LEFT FLANK PAIN: Status: ACTIVE | Noted: 2024-09-07

## 2024-09-07 PROBLEM — N20.0 RENAL CALCULI: Status: ACTIVE | Noted: 2024-09-07

## 2024-09-07 PROBLEM — Z87.448 HISTORY OF HYDRONEPHROSIS: Status: ACTIVE | Noted: 2024-09-07

## 2024-09-07 NOTE — PROGRESS NOTES
UROLOGY PROGRESS NOTE         NAME: Martin Luo  AGE: 24 y.o. SEX: male  : 2000   MRN: [id number]    DATE: 2024  TIME: 12:16 PM    Assessment and Plan   Procedures     Impression:   1. History of hydronephrosis  2. History of hematuria  3. History of left flank pain  4. Renal calculi       Plan: Patient I discussed at a young age with recurrent nephrolithiasis currently asymptomatic.  I highly recommend low oxalate diet and he is agreed for a nephrology consult for medical evaluation of nephrolithiasis.  Possibly they will do a 24-hour urine testing on the patient.    Recommend OV with us with a KUB in 2 years      Chief Complaint   No chief complaint on file.    History of Present Illness     HPI: Martin Luo is a 24 y.o. year old male who presents with follow-up emergency room visit on 2024 for difficulty urinating and blood in urine.  Patient states 2 weeks prior to the ER was diagnosed with a kidney stone.    CT scan on 2024 showed bilateral nonobstructing renal calculi, at that time the patient had bilateral flank pain.  The stones were measuring up to 2 mm.  CAT scan on 2024 showed nonobstructing right calculi and mild left hydronephrosis due to obstructing 2 mm stone at the left UVJ that apparently the patient subsequently passed.  Urinalysis was normal on 2024.    At his young age may want to consider nephrology evaluation for medical management of stones.  Currently patient asymptomatic feels well.  We discussed and reviewed his CAT scan from 2024 showing the nonobstructing x 3 right renal calculi and the recently passed left UVJ stone.  There is a possible small left stone as well.    Patient denies any irritable or obstructive voiding complaints no ED issues.      The following portions of the patient's history were reviewed and updated as appropriate: allergies, current medications, past family history, past medical history, past social history, past surgical  history and problem list.  Past Medical History:   Diagnosis Date    Essential tremor     Kidney stone     Migraines      Past Surgical History:   Procedure Laterality Date    NO PAST SURGERIES       shoulder  Review of Systems     Const: Denies chills, fever and weight loss.  CV: Denies chest pain.  Resp: Denies SOB.  GI: Denies abdominal pain, nausea and vomiting.  : Denies symptoms other than stated above.  Musculo: Denies back pain.    Objective   There were no vitals taken for this visit.    Physical Exam  Const: Appears healthy and well developed. No signs of acute distress present.  Resp: Respirations are regular and unlabored.   CV: Rate is regular. Rhythm is regular.  Abdomen: Abdomen is soft, nontender, and nondistended. Kidneys are not palpable.  : Abdomen soft bladder nontender normal external genitalia exam testicles no masses.  Psych: Patient's attitude is cooperative. Mood is normal. Affect is normal.    Current Medications     Current Outpatient Medications:     ibuprofen (MOTRIN) 800 mg tablet, Take 1 tablet (800 mg total) by mouth 3 (three) times a day, Disp: 21 tablet, Rfl: 0    naproxen (Naprosyn) 500 mg tablet, Take 1 tablet (500 mg total) by mouth 2 (two) times a day with meals for 5 days, Disp: 10 tablet, Rfl: 0    ondansetron (ZOFRAN) 4 mg tablet, Take 1 tablet (4 mg total) by mouth every 6 (six) hours, Disp: 12 tablet, Rfl: 0    ondansetron (ZOFRAN) 4 mg tablet, Take 1 tablet (4 mg total) by mouth every 6 (six) hours, Disp: 20 tablet, Rfl: 0    tamsulosin (FLOMAX) 0.4 mg, Take 1 capsule (0.4 mg total) by mouth daily with dinner for 7 days, Disp: 7 capsule, Rfl: 0    tamsulosin (FLOMAX) 0.4 mg, Take 1 capsule (0.4 mg total) by mouth daily with dinner for 10 days, Disp: 10 capsule, Rfl: 0        Coleman Malik MD

## 2024-09-09 RX ORDER — LORATADINE 10 MG/1
10 TABLET ORAL
COMMUNITY

## 2024-09-17 ENCOUNTER — OFFICE VISIT (OUTPATIENT)
Dept: UROLOGY | Facility: CLINIC | Age: 24
End: 2024-09-17
Payer: COMMERCIAL

## 2024-09-17 VITALS
HEART RATE: 70 BPM | SYSTOLIC BLOOD PRESSURE: 106 MMHG | DIASTOLIC BLOOD PRESSURE: 64 MMHG | WEIGHT: 117 LBS | BODY MASS INDEX: 19.49 KG/M2 | TEMPERATURE: 96.6 F | HEIGHT: 65 IN | OXYGEN SATURATION: 98 %

## 2024-09-17 DIAGNOSIS — Z87.448 HISTORY OF HEMATURIA: ICD-10-CM

## 2024-09-17 DIAGNOSIS — N20.0 RENAL CALCULI: ICD-10-CM

## 2024-09-17 DIAGNOSIS — Z87.448 HISTORY OF HYDRONEPHROSIS: Primary | ICD-10-CM

## 2024-09-17 DIAGNOSIS — Z87.898 HISTORY OF LEFT FLANK PAIN: ICD-10-CM

## 2024-09-17 PROCEDURE — 99203 OFFICE O/P NEW LOW 30 MIN: CPT | Performed by: UROLOGY

## 2025-02-09 ENCOUNTER — OFFICE VISIT (OUTPATIENT)
Dept: URGENT CARE | Facility: CLINIC | Age: 25
End: 2025-02-09
Payer: COMMERCIAL

## 2025-02-09 VITALS
HEART RATE: 110 BPM | WEIGHT: 111.4 LBS | TEMPERATURE: 96.8 F | SYSTOLIC BLOOD PRESSURE: 135 MMHG | RESPIRATION RATE: 18 BRPM | HEIGHT: 65 IN | DIASTOLIC BLOOD PRESSURE: 83 MMHG | OXYGEN SATURATION: 97 % | BODY MASS INDEX: 18.56 KG/M2

## 2025-02-09 DIAGNOSIS — K21.9 GASTROESOPHAGEAL REFLUX DISEASE, UNSPECIFIED WHETHER ESOPHAGITIS PRESENT: ICD-10-CM

## 2025-02-09 DIAGNOSIS — R11.2 NAUSEA AND VOMITING, UNSPECIFIED VOMITING TYPE: Primary | ICD-10-CM

## 2025-02-09 PROCEDURE — G0382 LEV 3 HOSP TYPE B ED VISIT: HCPCS

## 2025-02-09 PROCEDURE — S9083 URGENT CARE CENTER GLOBAL: HCPCS

## 2025-02-09 RX ORDER — FAMOTIDINE 20 MG/1
20 TABLET, FILM COATED ORAL 2 TIMES DAILY
Qty: 6 TABLET | Refills: 0 | Status: SHIPPED | OUTPATIENT
Start: 2025-02-09 | End: 2025-02-12

## 2025-02-09 NOTE — PROGRESS NOTES
[facility]        NAME: Martin Luo is a 24 y.o. male  : 2000    MRN: [id number]  DATE: 2025  TIME: 9:24 AM    Assessment and Plan   Nausea and vomiting, unspecified vomiting type [R11.2]  1. Nausea and vomiting, unspecified vomiting type  omeprazole (PriLOSEC) 20 mg delayed release capsule    famotidine (PEPCID) 20 mg tablet      2. Gastroesophageal reflux disease, unspecified whether esophagitis present  Ambulatory Referral to [facility]    Ambulatory Referral to Gastroenterology            Patient Instructions     Start taking omeprazole 20 mg daily and famotidine 20 mg twice daily.   Take these medications together for 3 days. After 3 days, stop taking the famotidine but continue to take the omeprazole.   Reduce your alcohol consumption.   Start a bland/brat (bananas/rice/applesauce/toast) diet for the next few days.   You can escalate your diet as tolerated.   An outpatient referral to gastroenterology was provided.     Follow up with PCP in 3-5 days.  Proceed to  ER if symptoms worsen.    If tests are performed, our office will contact you with results only if changes need to made to the care plan discussed with you at the visit. You can review your full [facility]    Chief Complaint     Chief Complaint   Patient presents with   • Abdominal Pain     Pt reports abdominal pain/burning for the past few weeks, thought it was reflux due to having sulfur smelling burps. Lastnight patient drank water and then vomited up after and noticed a long black string/old blood.         History of Present Illness       24-year-old male arrives reporting generalized abdominal pain ongoing for the past 3 weeks.  Patient reports last night after he drank water he vomited up a streak of dark blood.  Patient describes the abdominal pain as a burning sensation.  Patient denies any problems with urination, increased frequency, urgency or burning with urination.  Patient reports  he does have Zofran at home and has been taking it if he needs it and that is helping with his nausea and vomiting.  Patient denies any fevers.    Review of Systems   Review of Systems   Constitutional:  Positive for diaphoresis.   HENT: Negative.     Respiratory: Negative.     Cardiovascular: Negative.    Gastrointestinal:  Positive for abdominal pain, nausea and vomiting. Negative for diarrhea.   Genitourinary: Negative.    Musculoskeletal: Negative.          Current Medications       Current Outpatient Medications:   •  famotidine (PEPCID) 20 mg tablet, Take 1 tablet (20 mg total) by mouth 2 (two) times a day for 3 days, Disp: 6 tablet, Rfl: 0  •  omeprazole (PriLOSEC) 20 mg delayed release capsule, Take 1 capsule (20 mg total) by mouth daily for 14 days, Disp: 14 capsule, Rfl: 0  •  ibuprofen (MOTRIN) 800 mg tablet, Take 1 tablet (800 mg total) by mouth 3 (three) times a day (Patient not taking: Reported on 2/9/2025), Disp: 21 tablet, Rfl: 0  •  loratadine (CLARITIN) 10 mg tablet, Take 10 mg by mouth (Patient not taking: Reported on 2/9/2025), Disp: , Rfl:   •  Multiple Vitamins-Minerals (MULTIVITAL PO), Take 1 tablet by mouth daily (Patient not taking: Reported on 2/9/2025), Disp: , Rfl:   •  ondansetron (ZOFRAN) 4 mg tablet, Take 1 tablet (4 mg total) by mouth every 6 (six) hours, Disp: 12 tablet, Rfl: 0    Current Allergies     Allergies as of 02/09/2025 - Reviewed 02/09/2025   Allergen Reaction Noted   • Dust mite extract Other (See Comments) 12/27/2016   • Other Other (See Comments) 12/27/2016            The following portions of the patient's history were reviewed and updated as appropriate: allergies, current medications, past family history, past medical history, past social history, past surgical history and problem list.     Past Medical History:   Diagnosis Date   • Essential tremor    • Kidney stone    • Migraines        Past Surgical History:   Procedure Laterality Date   • NO PAST SURGERIES    "      Family History   Problem Relation Age of Onset   • No Known Problems Mother    • No Known Problems Father          Medications have been verified.        Objective   /83   Pulse (!) 110   Temp (!) 96.8 °F (36 °C)   Resp 18   Ht 5' 5\" (1.651 m)   Wt 50.5 kg (111 lb 6.4 oz)   SpO2 97%   BMI 18.54 kg/m²        Physical Exam     Physical Exam  Vitals and nursing note reviewed.   Constitutional:       General: He is not in acute distress.     Appearance: Normal appearance. He is well-developed. He is not ill-appearing, toxic-appearing or diaphoretic.   HENT:      Head: Normocephalic.      Right Ear: Tympanic membrane, ear canal and external ear normal.      Left Ear: Tympanic membrane, ear canal and external ear normal.      Nose: Nose normal.      Mouth/Throat:      Mouth: Mucous membranes are moist.      Pharynx: No posterior oropharyngeal erythema.   Eyes:      Extraocular Movements: Extraocular movements intact.      Pupils: Pupils are equal, round, and reactive to light.   Cardiovascular:      Rate and Rhythm: Tachycardia present.      Pulses: Normal pulses.      Heart sounds: Normal heart sounds.   Pulmonary:      Effort: Pulmonary effort is normal. No respiratory distress.      Breath sounds: Normal breath sounds. No stridor. No wheezing, rhonchi or rales.   Chest:      Chest wall: No tenderness.   Abdominal:      General: Bowel sounds are normal. There is no distension.      Palpations: Abdomen is soft. There is no mass.      Tenderness: There is no abdominal tenderness. There is no right CVA tenderness, left CVA tenderness, guarding or rebound.      Hernia: No hernia is present.   Musculoskeletal:         General: Normal range of motion.      Cervical back: Normal range of motion.   Lymphadenopathy:      Cervical: No cervical adenopathy.   Skin:     General: Skin is warm and dry.      Capillary Refill: Capillary refill takes less than 2 seconds.   Neurological:      General: No focal deficit " present.      Mental Status: He is alert and oriented to person, place, and time.   Psychiatric:         Mood and Affect: Mood normal.

## 2025-02-09 NOTE — PATIENT INSTRUCTIONS
Start taking omeprazole 20 mg daily and famotidine 20 mg twice daily.   Take these medications together for 3 days. After 3 days, stop taking the famotidine but continue to take the omeprazole.   Reduce your alcohol consumption.   Start a bland/brat (bananas/rice/applesauce/toast) diet for the next few days.   You can escalate your diet as tolerated.   An outpatient referral to gastroenterology was provided.     Follow up with PCP in 3-5 days.  Proceed to  ER if symptoms worsen.    If tests are performed, our office will contact you with results only if changes need to made to the care plan discussed with you at the visit. You can review your full results on [facility].

## 2025-03-18 ENCOUNTER — OFFICE VISIT (OUTPATIENT)
Dept: GASTROENTEROLOGY | Facility: CLINIC | Age: 25
End: 2025-03-18
Payer: COMMERCIAL

## 2025-03-18 VITALS
DIASTOLIC BLOOD PRESSURE: 81 MMHG | HEART RATE: 109 BPM | HEIGHT: 65 IN | BODY MASS INDEX: 20.49 KG/M2 | SYSTOLIC BLOOD PRESSURE: 129 MMHG | WEIGHT: 123 LBS | OXYGEN SATURATION: 96 % | TEMPERATURE: 97.6 F

## 2025-03-18 DIAGNOSIS — R10.13 EPIGASTRIC PAIN: ICD-10-CM

## 2025-03-18 DIAGNOSIS — R11.2 NAUSEA AND VOMITING, UNSPECIFIED VOMITING TYPE: ICD-10-CM

## 2025-03-18 DIAGNOSIS — K21.9 GASTROESOPHAGEAL REFLUX DISEASE, UNSPECIFIED WHETHER ESOPHAGITIS PRESENT: Primary | ICD-10-CM

## 2025-03-18 PROCEDURE — 99204 OFFICE O/P NEW MOD 45 MIN: CPT | Performed by: NURSE PRACTITIONER

## 2025-03-18 RX ORDER — OMEPRAZOLE 20 MG/1
CAPSULE, DELAYED RELEASE ORAL
Qty: 90 CAPSULE | Refills: 1 | Status: SHIPPED | OUTPATIENT
Start: 2025-03-18

## 2025-03-18 RX ORDER — FAMOTIDINE 20 MG/1
TABLET, FILM COATED ORAL
Qty: 90 TABLET | Refills: 1 | Status: SHIPPED | OUTPATIENT
Start: 2025-03-18

## 2025-03-18 NOTE — ASSESSMENT & PLAN NOTE
While the patient was in the office today, I discussed with the patient that overall since his symptoms have improved and it is his wishes to try to hold off any kind of endoscopic procedures unless it is absolutely necessary, that I would like him to go back on the omeprazole 20 mg daily in the morning and then also start an H2 blocker such as famotidine 20 mg, 1 pill at bedtime for the next 6 months to try to get everything to calm down, he will, and then at his next office visit we will reevaluate his symptoms and proceed from there as appropriate with regards the possibility of an EGD or starting to titrate off the meds.  The patient is to contact our office if he experiences any side effects or issues with the changes in his medication regimen.  The patient was agreeable and verbalized an understanding.    Encouraged the patient to avoid acidic or trigger foods including alcohol as much as possible.  Encouraged the patient to consider purchasing a wedge pillow to help prevent reflux symptoms while sleeping.  Encouraged the patient not to lay down or sleep for at least 3 to 4 hours after eating and to try to avoid late night snacking.  Continue to watch for red flag symptoms.     We did review GI red flag symptoms, including, but not limited to: chronic nausea, vomiting, diarrhea, chills, fever, and unintentional weight loss and should call or contact our office with any changes or concerns. I reviewed with the patient that if they notice any blood while vomiting or in their stool they should contact or office or go to the nearest emergency room for immediate evaluation. The patient was agreeable and verbalized an understanding.    Orders:  •  Ambulatory Referral to Gastroenterology  •  omeprazole (PriLOSEC) 20 mg delayed release capsule; Take 1 pill daily in AM prior to a meal.  •  famotidine (PEPCID) 20 mg tablet; Take 1 PO HS.

## 2025-03-18 NOTE — PATIENT INSTRUCTIONS
Re-start Omeprazole 20 mg, 1 pill daily in AM prior to a meal.   Start Famotidine 20 mg, 1 pill at bed time.   Encouraged the patient to avoid acidic or trigger foods including alcohol as much as possible.  Encouraged the patient to consider purchasing a wedge pillow to help prevent reflux symptoms while sleeping.  Encouraged the patient not to lay down or sleep for at least 3 to 4 hours after eating and to try to avoid late night snacking.  Continue to drink at least 4-5 cups of water daily.   Continue to watch for red flag symptoms.   Schedule a f/u OV in 6 months.     We did review GI red flag symptoms, including, but not limited to: chronic nausea, vomiting, diarrhea, chills, fever, and unintentional weight loss and should call or contact our office with any changes or concerns. I reviewed with the patient that if they notice any blood while vomiting or in their stool they should contact or office or go to the nearest emergency room for immediate evaluation. The patient was agreeable and verbalized an understanding.

## 2025-03-18 NOTE — PROGRESS NOTES
Name: Martin Luo      : 2000      MRN: [id number]  Encounter Provider: KAYLIN Rogers  Encounter Date: 3/18/2025   Encounter department: [facility] [place]  :  Assessment & Plan  Gastroesophageal reflux disease, unspecified whether esophagitis present  While the patient was in the office today, I discussed with the patient that overall since his symptoms have improved and it is his wishes to try to hold off any kind of endoscopic procedures unless it is absolutely necessary, that I would like him to go back on the omeprazole 20 mg daily in the morning and then also start an H2 blocker such as famotidine 20 mg, 1 pill at bedtime for the next 6 months to try to get everything to calm down, he will, and then at his next office visit we will reevaluate his symptoms and proceed from there as appropriate with regards the possibility of an EGD or starting to titrate off the meds.  The patient is to contact our office if he experiences any side effects or issues with the changes in his medication regimen.  The patient was agreeable and verbalized an understanding.    Encouraged the patient to avoid acidic or trigger foods including alcohol as much as possible.  Encouraged the patient to consider purchasing a wedge pillow to help prevent reflux symptoms while sleeping.  Encouraged the patient not to lay down or sleep for at least 3 to 4 hours after eating and to try to avoid late night snacking.  Continue to watch for red flag symptoms.     We did review GI red flag symptoms, including, but not limited to: chronic nausea, vomiting, diarrhea, chills, fever, and unintentional weight loss and should call or contact our office with any changes or concerns. I reviewed with the patient that if they notice any blood while vomiting or in their stool they should contact or office or go to the nearest emergency room for immediate evaluation. The patient was agreeable and verbalized an  understanding.    Orders:  •  Ambulatory Referral to Gastroenterology  •  omeprazole (PriLOSEC) 20 mg delayed release capsule; Take 1 pill daily in AM prior to a meal.  •  famotidine (PEPCID) 20 mg tablet; Take 1 PO HS.    Epigastric pain  Orders:  •  omeprazole (PriLOSEC) 20 mg delayed release capsule; Take 1 pill daily in AM prior to a meal.  •  famotidine (PEPCID) 20 mg tablet; Take 1 PO HS.    Nausea and vomiting, unspecified vomiting type  Orders:  •  omeprazole (PriLOSEC) 20 mg delayed release capsule; Take 1 pill daily in AM prior to a meal.  •  famotidine (PEPCID) 20 mg tablet; Take 1 PO HS.    The patient is to schedule a follow up office visit in 6 months, but understands to call or contact our offices with any issues before then or as needed.     History of Present Illness   Martin Luo is a 24 y.o. male who presents for his initial consultation and evaluation of his intermittent epigastric abdominal pain, GERD symptoms, and previous nausea and vomiting.  The patient reports that his symptoms started to worsen slowly over time and finally got to the point that it was so bad he went to the ER with possible coffee-ground emesis and was also having some loose stools at the time when he was initially having the issues and went to the ER in August.  However, the patient was put on a recent prescription of omeprazole, which he finished 2 weeks ago from the urgent care and felt that it helped all the symptoms, but does feel that now that he has stopped the omeprazole the symptoms are starting to return with regards to the epigastric pain and GERD symptoms but he has not had any nausea or vomiting.  He also reports that he has been trying to be careful about watching what he is eating and trying to avoid acidic or trigger foods.    The patient currently denies any nausea, dysphagia, vomiting, decreased appetite, unplanned weight loss, or abdominal pain. Water Intake: 4-5 cups daily.     The patient currently  "reports that they have a BM daily and reports that it is usually relieving, without any consistent diarrhea, constipation, straining, melena or bloody stools. Worthing: 3. Last BM: Today. Flatus: Occasionally.    PMH/PSH:   Abdominal/Chest Surgery: Denied  Colon Cancer: Denied  Any Cancer: Denied  Pre-Cancerous Polyps: N/A  Crohn's: N/A  Ulcerative Colitis: N/A  Ha's Esophagus: N/A  Celiac Disease: N/A  Liver Disease: Denied    Tobacco/Vaping: Vaping nicotine occasionally.  ETOH: Very rarely  Marijuana: Denied  Illicit Drug Use: Denied    FH:  Colon Cancer: Denied  Any Cancer: Denied  Family Members with Pre-Cancerous Polyps: Denied  Crohn's: Denied  Ulcerative Colitis: Denied  Celiac Disease: Denied  Liver Disease: Denied    GI Meds: None  Daily NSAID Use: Denied  Daily Tylenol Use: Denied  Any New Meds: Omeprazole.     Imaging: (8/8/24) CT of the abdomen and pelvis without contrast: Normal.    Endoscopy History: EGD: (None):      COLONOSCOPY: (None):     HPI  History obtained from: patient  Review of Systems A complete review of systems is negative other than that noted above in the HPI.      Current Outpatient Medications   Medication Sig Dispense Refill   • famotidine (PEPCID) 20 mg tablet Take 1 PO HS. 90 tablet 1   • omeprazole (PriLOSEC) 20 mg delayed release capsule Take 1 pill daily in AM prior to a meal. 90 capsule 1   • ondansetron (ZOFRAN) 4 mg tablet Take 1 tablet (4 mg total) by mouth every 6 (six) hours 12 tablet 0     No current facility-administered medications for this visit.     Objective   /81 (BP Location: Right arm, Patient Position: Sitting, Cuff Size: Standard)   Pulse (!) 109   Temp 97.6 °F (36.4 °C) (Temporal)   Ht 5' 5\" (1.651 m)   Wt 55.8 kg (123 lb)   SpO2 96%   BMI 20.47 kg/m²     Physical Exam   Sclera without icterus and benign. Lung sounds clear to auscultation b/l. Normal S1 & S2 upon exam. Abdomen is soft, nondistended, with very mild tenderness noted upon exam in " the epigastric region, without any guarding or rebound tenderness noted upon exam.  No edema noted of the b/l lower extremities upon exam today. Skin is non-icteric.     Lab Results: I personally reviewed relevant lab results.

## 2025-03-18 NOTE — ASSESSMENT & PLAN NOTE
Orders:  •  omeprazole (PriLOSEC) 20 mg delayed release capsule; Take 1 pill daily in AM prior to a meal.  •  famotidine (PEPCID) 20 mg tablet; Take 1 PO HS.

## 2025-03-21 PROBLEM — R10.13 EPIGASTRIC PAIN: Status: ACTIVE | Noted: 2025-03-21

## 2025-03-25 ENCOUNTER — OFFICE VISIT (OUTPATIENT)
Dept: FAMILY MEDICINE CLINIC | Facility: CLINIC | Age: 25
End: 2025-03-25
Payer: COMMERCIAL

## 2025-03-25 VITALS
OXYGEN SATURATION: 99 % | BODY MASS INDEX: 18.74 KG/M2 | DIASTOLIC BLOOD PRESSURE: 68 MMHG | WEIGHT: 112.5 LBS | TEMPERATURE: 97.8 F | HEART RATE: 79 BPM | SYSTOLIC BLOOD PRESSURE: 114 MMHG | HEIGHT: 65 IN

## 2025-03-25 DIAGNOSIS — N20.0 RECURRENT NEPHROLITHIASIS: Primary | ICD-10-CM

## 2025-03-25 DIAGNOSIS — F43.20 ADJUSTMENT DISORDER, UNSPECIFIED TYPE: ICD-10-CM

## 2025-03-25 DIAGNOSIS — K21.9 GASTROESOPHAGEAL REFLUX DISEASE, UNSPECIFIED WHETHER ESOPHAGITIS PRESENT: ICD-10-CM

## 2025-03-25 DIAGNOSIS — Z00.00 ANNUAL PHYSICAL EXAM: ICD-10-CM

## 2025-03-25 PROCEDURE — 99385 PREV VISIT NEW AGE 18-39: CPT | Performed by: FAMILY MEDICINE

## 2025-03-25 PROCEDURE — 99214 OFFICE O/P EST MOD 30 MIN: CPT | Performed by: FAMILY MEDICINE

## 2025-03-25 NOTE — ASSESSMENT & PLAN NOTE
Continue PPI and pepcid and GI f/u for possible scope    Orders:    Ambulatory Referral to Family Practice

## 2025-03-25 NOTE — PROGRESS NOTES
Name: Martin Luo      : 2000      MRN: [id number]  Encounter Provider: Robert Budinetz, MD  Encounter Date: 3/25/2025   Encounter department: [facility]  :  Assessment & Plan  Recurrent nephrolithiasis  Ref to nephrology  Orders:    Ambulatory Referral to Nephrology; Future    Gastroesophageal reflux disease, unspecified whether esophagitis present  Continue PPI and pepcid and GI f/u for possible scope    Orders:    Ambulatory Referral to Family Practice    Adjustment disorder, unspecified type  Ref to psych services  Orders:    Ambulatory referral to Psych Services; Future    Annual physical exam  Reviewed age-appropriate health maintenance and preventive care.                 Depression Screening and Follow-up Plan: Patient was screened for depression during today's encounter. They screened negative with a PHQ-2 score of 1.        History of Present Illness   Martin is here for his initial visit.  He is a very healthy and pleasant 24-year-old man.  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  He sees gastroenterology for heartburn he is starting on omeprazole and Pepcid and will follow-up with them in 6 months to consider a scope.  He also has recurrent kidney stones has seen urology they recommend due to his young age and this issue that he see nephrology for workup which I did place an order for.  He is going through an adjustment.  Getting out of a relationship break-up.  He was in a relationship with a woman about 10 years older than him she had children it was just a lot and it wore on him it was somewhat of a stressful break-up he is living with his parents now doing a lot better he would like to talk to a therapist.  He had blood work earlier this year.  He does vape occasionally he drinks socially he does not use drugs or alcohol excessively or otherwise.      Review of Systems   Respiratory: Negative.    "  Cardiovascular: Negative.    Gastrointestinal: Negative.        Objective   /68 (BP Location: Left arm, Patient Position: Sitting, Cuff Size: Standard)   Pulse 79   Temp 97.8 °F (36.6 °C) (Temporal)   Ht 5' 5\" (1.651 m)   Wt 51 kg (112 lb 8 oz)   SpO2 99%   BMI 18.72 kg/m²      Physical Exam  Vitals and nursing note reviewed.   Constitutional:       General: He is not in acute distress.     Appearance: He is well-developed.   HENT:      Head: Normocephalic and atraumatic.   Eyes:      Conjunctiva/sclera: Conjunctivae normal.   Cardiovascular:      Rate and Rhythm: Normal rate and regular rhythm.      Heart sounds: No murmur heard.  Pulmonary:      Effort: Pulmonary effort is normal. No respiratory distress.      Breath sounds: Normal breath sounds.   Abdominal:      Palpations: Abdomen is soft.      Tenderness: There is no abdominal tenderness.   Musculoskeletal:         General: No swelling.      Cervical back: Neck supple.   Skin:     General: Skin is warm and dry.      Capillary Refill: Capillary refill takes less than 2 seconds.   Neurological:      Mental Status: He is alert.   Psychiatric:         Mood and Affect: Mood normal.         "

## 2025-04-09 ENCOUNTER — TELEPHONE (OUTPATIENT)
Age: 25
End: 2025-04-09